# Patient Record
Sex: FEMALE | Race: BLACK OR AFRICAN AMERICAN | NOT HISPANIC OR LATINO | Employment: OTHER | URBAN - METROPOLITAN AREA
[De-identification: names, ages, dates, MRNs, and addresses within clinical notes are randomized per-mention and may not be internally consistent; named-entity substitution may affect disease eponyms.]

---

## 2023-05-25 ENCOUNTER — HOSPITAL ENCOUNTER (EMERGENCY)
Facility: OTHER | Age: 43
Discharge: HOME | End: 2023-05-25
Attending: EMERGENCY MEDICINE
Payer: MEDICAID

## 2023-05-25 VITALS
SYSTOLIC BLOOD PRESSURE: 145 MMHG | HEART RATE: 98 BPM | RESPIRATION RATE: 20 BRPM | DIASTOLIC BLOOD PRESSURE: 86 MMHG | OXYGEN SATURATION: 98 % | HEIGHT: 62 IN | WEIGHT: 190 LBS | TEMPERATURE: 98 F | BODY MASS INDEX: 34.96 KG/M2

## 2023-05-25 DIAGNOSIS — Z59.819 HOUSING SITUATION UNSTABLE: Primary | ICD-10-CM

## 2023-05-25 DIAGNOSIS — F31.9 BIPOLAR AFFECTIVE DISORDER, REMISSION STATUS UNSPECIFIED: ICD-10-CM

## 2023-05-25 DIAGNOSIS — Z11.1 SCREENING EXAMINATION FOR PULMONARY TUBERCULOSIS: ICD-10-CM

## 2023-05-25 LAB
CTP QC/QA: YES
SARS-COV-2 RDRP RESP QL NAA+PROBE: NEGATIVE

## 2023-05-25 PROCEDURE — 99284 EMERGENCY DEPT VISIT MOD MDM: CPT | Mod: 25

## 2023-05-25 RX ORDER — DIVALPROEX SODIUM 250 MG/1
1000 TABLET, FILM COATED, EXTENDED RELEASE ORAL DAILY
COMMUNITY
Start: 2023-02-15 | End: 2023-05-25 | Stop reason: SDUPTHER

## 2023-05-25 RX ORDER — RISPERIDONE 0.5 MG/1
0.5 TABLET, ORALLY DISINTEGRATING ORAL DAILY
COMMUNITY
End: 2023-05-25 | Stop reason: SDUPTHER

## 2023-05-25 RX ORDER — AMLODIPINE BESYLATE 10 MG/1
10 TABLET ORAL DAILY
Qty: 30 TABLET | Refills: 0 | Status: SHIPPED | OUTPATIENT
Start: 2023-05-25 | End: 2023-06-24

## 2023-05-25 RX ORDER — RISPERIDONE 0.5 MG/1
0.5 TABLET, ORALLY DISINTEGRATING ORAL DAILY
Qty: 30 TABLET | Refills: 0 | Status: SHIPPED | OUTPATIENT
Start: 2023-05-25 | End: 2023-06-24

## 2023-05-25 RX ORDER — NIFEDIPINE 30 MG/1
30 TABLET, FILM COATED, EXTENDED RELEASE ORAL
COMMUNITY
End: 2023-05-25

## 2023-05-25 RX ORDER — DIVALPROEX SODIUM 250 MG/1
1000 TABLET, FILM COATED, EXTENDED RELEASE ORAL DAILY
Qty: 120 TABLET | Refills: 0 | Status: SHIPPED | OUTPATIENT
Start: 2023-05-25 | End: 2023-06-24

## 2023-05-25 RX ORDER — LURASIDONE HYDROCHLORIDE 120 MG/1
120 TABLET, FILM COATED ORAL DAILY
COMMUNITY
Start: 2023-02-15 | End: 2023-05-25 | Stop reason: SDUPTHER

## 2023-05-25 RX ORDER — LURASIDONE HYDROCHLORIDE 120 MG/1
120 TABLET, FILM COATED ORAL DAILY
Qty: 30 TABLET | Refills: 0 | Status: SHIPPED | OUTPATIENT
Start: 2023-05-25 | End: 2023-06-24

## 2023-05-25 SDOH — SOCIAL DETERMINANTS OF HEALTH (SDOH): HOUSING INSTABILITY UNSPECIFIED: Z59.819

## 2023-05-25 NOTE — ED PROVIDER NOTES
"  Source of History:  Medical record, patient    Chief complaint:  Per triage note: "PPD Reading and Medication Refill (Requesting tb test and med refill)  "    HPI:    Patient presents requesting TB screening and medication refill.  She was released from [facility] yesterday, does not have access to her medications because she was locked out of her apartment by her landlord.  She does not have a place to stay tonight.  She expects she should be able to get back into her apartment at least to  her medications in the next several days.     ROS:   See HPI for pertinent review of systems      Review of patient's allergies indicates:   Allergen Reactions    Bactrim [sulfamethoxazole-trimethoprim] Nausea And Vomiting    Tomato (solanum lycopersicum) Diarrhea       PMH:  As per HPI and below:  Past Medical History:   Diagnosis Date    Depression     Hypertension        History reviewed. No pertinent surgical history.    Social History     Tobacco Use    Smoking status: Every Day     Types: Cigarettes   Substance Use Topics    Alcohol use: No    Drug use: No       Physical Exam:      Nursing note and vitals reviewed.  BP (!) 145/86 (BP Location: Left arm, Patient Position: Sitting)   Pulse 98   Temp 98.4 °F (36.9 °C) (Oral)   Resp 20   Ht 5' 2" (1.575 m)   Wt 86.2 kg (190 lb)   SpO2 98%   BMI 34.75 kg/m²     Constitutional: AAOx3. Well appearing.   Eyes: EOMI. No discharge. Anicteric.  HENT:   Mouth/Throat:    Neck: Normal range of motion. Neck supple.    Cardiovascular: Normal rate  Pulmonary/Chest: No respiratory distress.   Abdominal: No distension   Musculoskeletal: Normal range of motion.   Neurological: GCS15. Alert and oriented to person, place, and time. No gross cranial nerve II-XII, focal strength, or light touch deficit. Steady gait.   Skin: Skin is warm and dry.   Psychiatric: Behavior is normal. Judgment normal.  Linear thought process.  Good insight.     Medical Decision Making / Independent " Interpretations / External Records Reviewed:         Patient is a 42-year-old just released from jail, with history of bipolar disorder, hypertension who requests screening evaluation for shelter placement, as well as refills of her medications.  She states that her landlord changed the locks on her apartment.   She otherwise denies any acute complaints.   I independently reviewed and interpreted CXR which shows no pneumothorax, no focal consolidation, no cardiomegaly, no acute process.  I independently interpreted and reviewed the patient's plan of care COVID screening test which is negative.  Patient cleared for shelter placement.  I placed an outpatient case management consult order to help patient with housing, potential future problems with her medications. Pt states she has a regular PCP and psychiatrist.   I decided to obtain the patient's medical records. I reviewed patient's prior external notes / results: external hospital emergency department encounter.     The visit was significantly impacted by social determinant of health: unstable housing and psychiatric illness history      Medications - No data to display         No future appointments.     Diagnostic Impression:    1. Housing situation unstable    2. Screening examination for pulmonary tuberculosis    3. Bipolar affective disorder, remission status unspecified         ED Disposition Condition    Discharge Good          ED Prescriptions       Medication Sig Dispense Start Date End Date Auth. Provider    lurasidone 120 mg Tab Take 1 tablet (120 mg total) by mouth once daily. 30 tablet 5/25/2023 6/24/2023 Eddie Ruiz MD    divalproex ER (DEPAKOTE ER) 250 MG 24 hr tablet Take 4 tablets (1,000 mg total) by mouth once daily. 120 tablet 5/25/2023 6/24/2023 Eddie Ruiz MD    amLODIPine (NORVASC) 10 MG tablet Take 1 tablet (10 mg total) by mouth once daily. 30 tablet 5/25/2023 6/24/2023 Eddie Ruiz MD    risperiDONE (RISPERDAL M-TABS) 0.5  MG TbDL Take 1 tablet (0.5 mg total) by mouth once daily. 30 tablet 5/25/2023 6/24/2023 Eddie Ruiz MD          Follow-up Information       Follow up With Specialties Details Why Contact Info    Brittney Daniels) MD [facility] Schedule an appointment as soon as possible for a visit  For recheck with your primary care doctor [address] [facility] [address]  [phone]                 Eddie Ruiz MD  05/25/23 2644

## 2023-05-25 NOTE — DISCHARGE INSTRUCTIONS
Patient has no evidence of active tuberculosis.    Point of care COVID test is negative.    Patient medically cleared for shelter placement    Homeless Resources     [facility]  Primary healthcare, case management, dental services, TB test placement  Call ahead  [address]  2nd [address]  [phone]    [employer]  [address]  [phone]  Take in between 4pm-6pm  Must have State ID  Emergency shelter for individuals and families      [facility]  [address] [facility]  [phone]  Takes in all day until 3:30pm  Emergency shelter for men only  Meals daily 6am, 2pm, & 6pm  Clothing, case management M-F by appointment (ID/job/housing/legal assistance), mail    [facility]  [phone]  Take in 4pm-6pm  Emergency shelter for men  [address] [facility] [address] [facility]  Emergency shelter for women  [address] [facility]  Breakfast & lunch daily, dinner M-F  Case management, job counseling services    [facility] Center  aka [facility]  [address] [facility] ([facility])  [phone]  Based on availability, have to call ahead    Community Resource and referral Center ( [facility])  Meals 10-11:30am, showers Laundry, Housing Assistance, Job Counseling   [address] [place] [address]  Phone: [phone]    [facility]  Emergency shelter for teens and young adults up to 20yo  [address] [facility]  [phone]    [facility]   (Must be referred by [place])  Emergency shelter for women over 17yo and their kids  [address] [place] [address] [phone]    [facility]  Day program, meals M-F 1PM (arrive early)  Showers, laundry, hygiene kits, showers, phones, , notary services, case management, ID assistance  [address] [facility] [address] [facility]  [phone] M-F 8am-2:30pm    [facility]  Day program  MTWF 7:30am-3:30pm,  8:30am-3:30pm  Crisis intervention, employment assistance, food/clothing, hygiene kits, bus tokens,  mail  [address] [facility] [address] [facility] [address]  [phone]    [facility]  Mobile outreach for homeless persons in [facility]  [phone]    [facility]  Primary healthcare, case management, dental services, TB test placement  Call ahead  [address]  2nd [address]  [phone]    [facility]  Connects homeless people with their loved ones in other cities by providing transportation costs   [phone]        You need to see a primary care doctor or psychiatrist for your mood problems.    If you do not have insurance contact [facility] at [phone].    You may also contact [facility] ([phone]) or [facility] ([phone]).    For Mental Health or Drug Abuse Resources, Please See Below:    [facility] ([facility]) Crisis Services for problems with mental health (suicidal, overwhelmed, agitated, despressed, withdrawn, etc.), problems with drugs/alcohol, or developmental disabilities. Accepts uninsured and medicaid:   [phone] or [phone]   [facility] websites:   www.[facility].org/services/crisis   www.[facility].org     [facility]: The ONLY initial inpatient option for detox/medically-supported withdrawal in [place] for UNinsured clients.   Lasts 4 to 7 days.   Requirements: must be at least 22 years old & have Medicaid or be uninsured).   [facility] also has residential substance treatment/rehab programs after intial detox is accomplished.   [phone]   www.[facility].org       [facility] (men, at least 18 years old) & [facility] (women, at least 18 years old):   NO initial Detox. They don't do initial detox nor medically assisted withdrawals. Clients can go to their choice of initial detox at [facility], [facility], [place], [place], or wherever [facility] would advise.   Has long-term residential substance abuse treatment programs, where clients live and participate in intense rehabilitation.   [phone]    www.[facility].org     [facility]:   Should have insurance or Medicaid. Otherwise, for UNinsured, it costs $4000 for the 5-day detox program, payable on arrival.   [phone] or [phone]   www.[employer] [place]     OUT-Patient resources:   [facility] (No age limits)   Offers intensive outpatient treatment, medically-assisted outpatient detox with suboxone, counseling, AA/12 Steps, etc  Accepts uninsured residents of [place], [place], or [facility]. Residents of other [facility] must contact the Human Services District in their [place] for options.   [facility] locations:   [place] [phone]   [place]: [phone]   [place]: [phone]     [place]:   Accepts insurances, cash, credit card or financing plan for payment  [phone]   In [place]: [phone]   In [place]: [phone]   ---- ----       ADDITIONAL Addiction & Mental Health RESOURCES:   Dial 2 1 1   Dial 211 anytime 24 / 7 for current local resources for addiction, suicide prevention, help on how to cope, obtain services, etc.       [facility] - Crisis Services (For . Residents needing care for mental health, addiction or developmental disabilities)   [phone]   www.[facility].org     [facility] - Substance Abuse & Mental Health Services Administration (Educational, not a Crisis resource)   www.samhsa.org   Various meds used for M.A.T. (medically assisted treatment) of opioid addiction:   http://www.samhsa.gov/medication-assisted-treatment         Other Mental Health Clinics (MHCs) include:     [facility] [address] [facility]. [phone]     [facility] [address] [phone]     [facility] [address] [place] [address] [phone]     [facility] [address] [facility] [phone]     Anyone who is suicidal may receive immediate help by going to the ER, calling 911, calling 988, or going to Suicide.org. Suicide is preventable, and if you are feeling suicidal, you must get  help.

## 2023-06-11 ENCOUNTER — HOSPITAL ENCOUNTER (EMERGENCY)
Facility: OTHER | Age: 43
Discharge: HOME | End: 2023-06-11
Attending: EMERGENCY MEDICINE
Payer: MEDICAID

## 2023-06-11 VITALS
TEMPERATURE: 98 F | DIASTOLIC BLOOD PRESSURE: 67 MMHG | SYSTOLIC BLOOD PRESSURE: 125 MMHG | WEIGHT: 190 LBS | HEIGHT: 62 IN | OXYGEN SATURATION: 97 % | RESPIRATION RATE: 20 BRPM | HEART RATE: 103 BPM | BODY MASS INDEX: 34.96 KG/M2

## 2023-06-11 DIAGNOSIS — Z11.1 SCREENING-PULMONARY TB: ICD-10-CM

## 2023-06-11 DIAGNOSIS — Z00.00 EVALUATION BY MEDICAL SERVICE REQUIRED: Primary | ICD-10-CM

## 2023-06-11 LAB
B-HCG UR QL: NEGATIVE
CTP QC/QA: YES
CTP QC/QA: YES
SARS-COV-2 RDRP RESP QL NAA+PROBE: NEGATIVE

## 2023-06-11 PROCEDURE — 99283 EMERGENCY DEPT VISIT LOW MDM: CPT | Mod: 25

## 2023-06-11 PROCEDURE — 81025 URINE PREGNANCY TEST: CPT | Performed by: EMERGENCY MEDICINE

## 2023-06-11 NOTE — ED PROVIDER NOTES
Encounter Date: 6/11/2023    SCRIBE #1 NOTE: I, Luis Carlos Nunes, am scribing for, and in the presence of,  Juana Bowers MD. I have scribed the following portions of the note - Other sections scribed: HPI, ROS, PE.     History     Chief Complaint   Patient presents with    shelter clearance      Requesting xray and covid test for entrance into [facility]. Denies symptoms.      Time seen by provider: 2:33 PM    This is a 42 y.o. female who presents with a request for shelter clearance. She states she is otherwise feeling well. Patient denies any other complaints at this time.    The history is provided by the patient.   Review of patient's allergies indicates:   Allergen Reactions    Bactrim [sulfamethoxazole-trimethoprim] Nausea And Vomiting    Tomato (solanum lycopersicum) Diarrhea     Past Medical History:   Diagnosis Date    Depression     Hypertension      No past surgical history on file.  No family history on file.  Social History     Tobacco Use    Smoking status: Every Day     Types: Cigarettes   Substance Use Topics    Alcohol use: No    Drug use: No     Review of Systems   Constitutional:  Negative for chills and fever.   HENT:  Negative for sore throat.    Respiratory:  Negative for shortness of breath.    Cardiovascular:  Negative for chest pain.   Gastrointestinal:  Negative for nausea.   Genitourinary:  Negative for dysuria.   Musculoskeletal:  Negative for back pain and neck pain.   Skin:  Negative for color change and rash.   Neurological:  Negative for dizziness, weakness and headaches.   Hematological:  Does not bruise/bleed easily.     Physical Exam     Initial Vitals [06/11/23 1404]   BP Pulse Resp Temp SpO2   125/67 103 20 98.3 °F (36.8 °C) 97 %      MAP       --         Physical Exam    Nursing note and vitals reviewed.  Constitutional: She appears well-developed and well-nourished. She is not diaphoretic. No distress.   HENT:   Head: Normocephalic and atraumatic.   Right Ear: External ear  normal.   Left Ear: External ear normal.   Eyes: Conjunctivae and EOM are normal.   Neck: No tracheal deviation present.   Normal range of motion.  Pulmonary/Chest: No accessory muscle usage or stridor. No tachypnea. No respiratory distress.   Musculoskeletal:         General: No edema. Normal range of motion.      Cervical back: Normal range of motion.     Neurological: She is alert and oriented to person, place, and time. She displays no tremor. She displays no seizure activity. Coordination and gait normal.   Ambulatory with steady gait.     Skin: Skin is warm, dry and intact. Capillary refill takes less than 2 seconds. No rash noted. No erythema.       ED Course   Procedures  Labs Reviewed   POCT URINE PREGNANCY   SARS-COV-2 RDRP GENE          Imaging Results              X-Ray Chest AP Portable (Final result)  Result time 06/11/23 14:50:27      Final result by Paulie Vasquez MD (06/11/23 14:50:27)                   Impression:      1. No acute cardiopulmonary process.      Electronically signed by: Paulie Vasquez MD  Date:    06/11/2023  Time:    14:50               Narrative:    EXAMINATION:  XR CHEST AP PORTABLE    CLINICAL HISTORY:  Encounter for screening for respiratory tuberculosis    TECHNIQUE:  Single frontal view of the chest was performed.    COMPARISON:  05/25/2023    FINDINGS:  The cardiomediastinal silhouette is not enlarged.  There is no pleural effusion.  The trachea is midline.  The lungs are symmetrically expanded bilaterally without evidence of acute parenchymal process. No large focal consolidation seen.  There is no pneumothorax.  The osseous structures are remarkable for degenerative changes..                                    X-Rays:   Independently Interpreted Readings:   Chest X-Ray: Trachea midline. No cardiomegaly. No effusion, edema or pneumothorax.    Medications - No data to display  Medical Decision Making:   History:   Old Medical Records: I decided to obtain old medical  records.  Old Records Summarized: other records and records from another hospital.  Initial Assessment:   2:33PM:  Patient is a 42 old female who presents to the emergency department requesting shelter clearance.  She is otherwise asymptomatic.  Will plan for chest x-ray, COVID swab, will continue to follow and reassess.  Independently Interpreted Test(s):   I have ordered and independently interpreted X-rays - see prior notes.  Clinical Tests:   Lab Tests: Ordered and Reviewed  Radiological Study: Ordered and Reviewed     2:59PM:  Patient's chest x-ray is negative for any acute findings.  She is COVID negative and her UPT is negative.  I do not feel that further work up in the ED is indicated at this time.  I updated pt regarding results.  I have discussed with the pt ED return warnings and need for close PCP f/u.  Pt agreeable to plan and all questions answered.  I feel that pt is stable for discharge and management as an outpatient and no further intervention is needed at this time.  Pt is comfortable returning to the ED if needed.  Will DC home in stable condition.         Scribe Attestation:   Scribe #1: I performed the above scribed service and the documentation accurately describes the services I performed. I attest to the accuracy of the note.       Physician Attestation for Scribe: I, Juana Bowers, reviewed documentation as scribed in my presence, which is both accurate and complete.             Clinical Impression:   Final diagnoses:  [Z11.1] Screening-pulmonary TB  [Z00.00] Evaluation by medical service required (Primary)        ED Disposition Condition    Discharge Stable          ED Prescriptions    None       Follow-up Information       Follow up With Specialties Details Why Contact Info    Brittney Lewis MD ([facility]) [facility]   [address] [facility] [id number] [phone]               Juana Bowers MD  06/11/23 4187

## 2023-06-11 NOTE — ED TRIAGE NOTES
Pt presents to the ED requesting TB test and covid test for shelter clearance. Pt denies further complaints at this time.

## 2023-06-14 ENCOUNTER — PATIENT OUTREACH (OUTPATIENT)
Dept: EMERGENCY MEDICINE | Facility: OTHER | Age: 43
End: 2023-06-14
Payer: MEDICAID

## 2023-06-14 NOTE — PROGRESS NOTES
Constanza Soriano LPN  ED Navigator  Emergency Department    Project: [facility] ED Navigator  Role: Community Health Worker    Date: 06/14/2023  Patient Name: Beatriz Guo  MRN: [id number]  PCP: Brittney Lewis MD    Assessment:     Beatriz Guo is a 42 y.o. female who has presented to ED for Shelter Clearance. Patient has visited the ED 2 times in the past 3 months. Patient did not contact PCP.     ED Navigator Initial Assessment    ED Navigator Enrollment Documentation  Consent to Services  Does patient consent to completing the assessment?: Yes  Contact  Method of Initial Contact: Phone  Transportation  Does the patient have issues with Transportation?: No  Does the patient have transportation to and from healthcare appointments?: Yes  Insurance Coverage  Do you have coverage/adequate coverage?: Yes  Type/kind of coverage: [facility] COMMUNITY PLAN [facility] ([place] MEDICAID)  Is patient able to afford co-pays/deductibles?: Yes  Is patient able to afford HME or supplies?: Yes  Does patient have an established [facility] PCP?: Yes  Able to access?: Yes  Does the patient have a lack of adequate coverage?: No  Specialist Appointment  Did the patient come to the ED to see a specialist?: No  Does the patient have a pending specialist referral?: No  Does the patient have a specialist appointment made?: No  PCP Follow Up Appointment  Has the patient had an appointment with a primary care provider in the past year?: Yes  Approximate date: 4/13/23  Provider: Charlene Chapa NP  Does the patient have a follow up appontment with a PCP?: No  When was the last time you saw your PCP?: 4/13/23  Why does the patient not have a follow up scheduled?: Other (see comments) (Comment: Expressed no scheduling assistance needs)  Medications  Is patient able to afford medication?: Yes  Is patient unable to get medication due to lack of transportation?: No  Psychological  Does the patient have psycho-social concerns?: Yes  What concerns does the  patient have?: Mental health (Comment: Already receives behavioral health care)  Food  Does the patient have concerns about food?: Yes  What concerns does the patient have regarding food?: Lack of food, Lack of access to food  Communication/Education  Does the patient have limited English proficiency/English not primary language?: No  Does patient have low literacy and/or low health literacy?: Yes  Does patient have concerns with care?: No  Does patient have dissatisfaction with care?: No  Other Financial Concerns  Does the patient have immediate financial distress?: Yes  Does the patient have general financial concerns?: Yes  Other Social Barriers/Concerns  Does the patient have any additional barriers or concerns?: Unable to afford utilities, Housing concerns  Primary Barrier  Barriers identified: Cognitive barrier (health literacy, language and communication, etc.)  Root Cause of ED Utilization: Patient Knowledge/Low Health Literacy  Plan to address Patient Knowledge/Low Health Literacy: Provided information for [facility] 24/7 Nurse triage line [phone] or 1-866-[facility] ([phone])  Next steps: Provided Education  Was education/educational materials provided surrounding PCP services/creating a medical home?: Yes Was education verbal or written?: Written     Was education/educational materials provided surrounding low cost, healthy foods?: Yes Was education verbal or written?: Written     Was education/educational materials provided surrounding other items? If so, use comment to explain.: Yes (Comment: Food resources, transitional housing programs) Was education verbal or written?: Written   Plan: Provided information for [facility] 24/7 Nurse triage line, [phone] or 1-866-[facility] ([phone])  Expected Date of Follow Up 1: 9/11/23  Additional Documentation: Spoke with patient today and she was agreeable to enrollment and subsequent F/U calls. Pt. Denies any psychosocial needs at this  time. Pt. Is established with primary care and expressed no scheduling assistance needs at this time. Pt stated she has a lot going on right now with working on housing so she would like to work on that first and is at the library right now doing some research. Pt is currently residing at a shelter and she stated it is temporary and is working on her voucher already. Pt. Is a current smoker and smoking cessation services info was provided via e-mail. Pt. Would like resources for transitional housing programs and food. Pt already utilizes Medicaid transportation for appointments. [facility] form, [place] Virtual Visit Flyer, [facility] PCP scheduling assistance, [facility] on call RN#, and Heart Healthy Diet education all sent to email as well.          Social History     Socioeconomic History    Marital status: Single   Tobacco Use    Smoking status: Every Day     Types: Cigarettes   Substance and Sexual Activity    Alcohol use: No    Drug use: No     Social Determinants of Health     Financial Resource Strain: High Risk    Difficulty of Paying Living Expenses: Very hard   Food Insecurity: Food Insecurity Present    Worried About Running Out of Food in the Last Year: Sometimes true    Ran Out of Food in the Last Year: Never true   Transportation Needs: No Transportation Needs    Lack of Transportation (Medical): No    Lack of Transportation (Non-Medical): No   Stress: Stress Concern Present    Feeling of Stress : To some extent   Social Connections: Unknown    Marital Status: Never    Housing Stability: High Risk    Unable to Pay for Housing in the Last Year: Yes    Unstable Housing in the Last Year: Yes       Plan:   Spoke with patient today and she was agreeable to enrollment and subsequent F/U calls. Pt. Denies any psychosocial needs at this time. Pt. Is established with primary care and expressed no scheduling assistance needs at this time. Pt stated she has a lot going on right now with working on housing  so she would like to work on that first and is at the library right now doing some research. Pt is currently residing at a shelter and she stated it is temporary and is working on her voucher already. Pt. Is a current smoker and smoking cessation services info was provided via e-mail. Pt. Would like resources for transitional housing programs and food. Pt already utilizes Medicaid transportation for appointments. [facility] form, [place] Virtual Visit Flyer, [facility] PCP scheduling assistance, [facility] on call RN#, and Heart Healthy Diet education all sent to email as well.

## 2023-09-11 ENCOUNTER — PATIENT OUTREACH (OUTPATIENT)
Dept: EMERGENCY MEDICINE | Facility: OTHER | Age: 43
End: 2023-09-11
Payer: MEDICAID

## 2023-12-29 ENCOUNTER — PATIENT OUTREACH (OUTPATIENT)
Dept: EMERGENCY MEDICINE | Facility: OTHER | Age: 43
End: 2023-12-29
Payer: MEDICAID